# Patient Record
Sex: MALE | Race: BLACK OR AFRICAN AMERICAN
[De-identification: names, ages, dates, MRNs, and addresses within clinical notes are randomized per-mention and may not be internally consistent; named-entity substitution may affect disease eponyms.]

---

## 2020-01-17 ENCOUNTER — HOSPITAL ENCOUNTER (INPATIENT)
Dept: HOSPITAL 4 - SED | Age: 65
LOS: 4 days | Discharge: HOME | DRG: 279 | End: 2020-01-21
Attending: FAMILY MEDICINE | Admitting: FAMILY MEDICINE
Payer: MEDICAID

## 2020-01-17 VITALS — BODY MASS INDEX: 32.64 KG/M2 | WEIGHT: 228 LBS | HEIGHT: 70 IN

## 2020-01-17 VITALS — SYSTOLIC BLOOD PRESSURE: 142 MMHG

## 2020-01-17 VITALS — SYSTOLIC BLOOD PRESSURE: 98 MMHG

## 2020-01-17 VITALS — SYSTOLIC BLOOD PRESSURE: 128 MMHG

## 2020-01-17 VITALS — SYSTOLIC BLOOD PRESSURE: 111 MMHG

## 2020-01-17 DIAGNOSIS — I10: ICD-10-CM

## 2020-01-17 DIAGNOSIS — Z88.6: ICD-10-CM

## 2020-01-17 DIAGNOSIS — E11.65: ICD-10-CM

## 2020-01-17 DIAGNOSIS — T50.905A: ICD-10-CM

## 2020-01-17 DIAGNOSIS — F17.210: ICD-10-CM

## 2020-01-17 DIAGNOSIS — K74.60: ICD-10-CM

## 2020-01-17 DIAGNOSIS — G92: ICD-10-CM

## 2020-01-17 DIAGNOSIS — E86.0: ICD-10-CM

## 2020-01-17 DIAGNOSIS — D64.9: ICD-10-CM

## 2020-01-17 DIAGNOSIS — D69.6: ICD-10-CM
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
## 2020-01-17 DIAGNOSIS — K72.00: Primary | ICD-10-CM

## 2020-01-17 DIAGNOSIS — Z88.0: ICD-10-CM

## 2020-01-17 LAB
ALBUMIN SERPL BCP-MCNC: 2.4 G/DL (ref 3.4–4.8)
ALT SERPL W P-5'-P-CCNC: 70 U/L (ref 12–78)
AMPHETAMINES UR QL SCN: NEGATIVE
ANION GAP SERPL CALCULATED.3IONS-SCNC: 13 MMOL/L (ref 5–15)
APPEARANCE UR: CLEAR
AST SERPL W P-5'-P-CCNC: 119 U/L (ref 10–37)
BARBITURATES UR QL SCN: NEGATIVE
BASOPHILS # BLD AUTO: 0 K/UL (ref 0–0.2)
BASOPHILS NFR BLD AUTO: 0.7 % (ref 0–2)
BENZODIAZ UR QL SCN: POSITIVE
BILIRUB SERPL-MCNC: 1.3 MG/DL (ref 0–1)
BILIRUB UR QL STRIP: NEGATIVE
BUN SERPL-MCNC: 14 MG/DL (ref 8–21)
BZE UR QL SCN: NEGATIVE
CALCIUM SERPL-MCNC: 8.4 MG/DL (ref 8.4–11)
CANNABINOIDS UR QL SCN: POSITIVE
CHLORIDE SERPL-SCNC: 103 MMOL/L (ref 98–107)
COLOR UR: YELLOW
CREAT SERPL-MCNC: 0.88 MG/DL (ref 0.55–1.3)
EOSINOPHIL # BLD AUTO: 0.1 K/UL (ref 0–0.4)
EOSINOPHIL NFR BLD AUTO: 1.5 % (ref 0–4)
ERYTHROCYTE [DISTWIDTH] IN BLOOD BY AUTOMATED COUNT: 14.5 % (ref 9–15)
ETHANOL SERPL-MCNC: < 3 MG/DL (ref ?–10)
GFR SERPL CREATININE-BSD FRML MDRD: 111 ML/MIN (ref 90–?)
GLUCOSE SERPL-MCNC: 185 MG/DL (ref 70–99)
GLUCOSE UR STRIP-MCNC: NEGATIVE MG/DL
HCT VFR BLD AUTO: 33 % (ref 36–54)
HGB BLD-MCNC: 11.5 G/DL (ref 14–18)
HGB UR QL STRIP: NEGATIVE
KETONES UR STRIP-MCNC: NEGATIVE MG/DL
LEUKOCYTE ESTERASE UR QL STRIP: NEGATIVE
LIPASE SERPL-CCNC: 417 U/L (ref 73–393)
LYMPHOCYTES # BLD AUTO: 1.9 K/UL (ref 1–5.5)
LYMPHOCYTES NFR BLD AUTO: 32.5 % (ref 20.5–51.5)
MCH RBC QN AUTO: 33 PG (ref 27–31)
MCHC RBC AUTO-ENTMCNC: 35 % (ref 32–36)
MCV RBC AUTO: 94 FL (ref 79–98)
METHADONE UR-SCNC: NEGATIVE UMOL/L
METHAMPHET UR-SCNC: NEGATIVE UMOL/L
MONOCYTES # BLD MANUAL: 0.7 K/UL (ref 0–1)
MONOCYTES # BLD MANUAL: 12.7 % (ref 1.7–9.3)
NEUTROPHILS # BLD AUTO: 3.1 K/UL (ref 1.8–7.7)
NEUTROPHILS NFR BLD AUTO: 52.6 % (ref 40–70)
NITRITE UR QL STRIP: NEGATIVE
OPIATES UR QL SCN: POSITIVE
OXYCODONE SERPL-MCNC: NEGATIVE NG/ML
PCP UR QL SCN: NEGATIVE
PH UR STRIP: 6 [PH] (ref 5–8)
PLATELET # BLD AUTO: 87 K/UL (ref 130–430)
POTASSIUM SERPL-SCNC: 4.1 MMOL/L (ref 3.5–5.1)
PROT UR QL STRIP: NEGATIVE
RBC # BLD AUTO: 3.52 MIL/UL (ref 4.2–6.2)
SODIUM SERPLBLD-SCNC: 134 MMOL/L (ref 136–145)
SP GR UR STRIP: 1.02 (ref 1–1.03)
TRICYCLICS UR-MCNC: NEGATIVE NG/ML
URINE PROPOXYPHENE SCREEN: NEGATIVE
UROBILINOGEN UR STRIP-MCNC: 2 MG/DL (ref 0.2–1)
WBC # BLD AUTO: 5.9 K/UL (ref 4.8–10.8)

## 2020-01-17 PROCEDURE — G0482 DRUG TEST DEF 15-21 CLASSES: HCPCS

## 2020-01-17 PROCEDURE — G0378 HOSPITAL OBSERVATION PER HR: HCPCS

## 2020-01-17 PROCEDURE — C9113 INJ PANTOPRAZOLE SODIUM, VIA: HCPCS

## 2020-01-17 RX ADMIN — Medication SCH EA: at 21:20

## 2020-01-17 RX ADMIN — LACTULOSE SCH ML: 10 SOLUTION ORAL; RECTAL at 21:17

## 2020-01-17 RX ADMIN — POTASSIUM CHLORIDE AND SODIUM CHLORIDE SCH MLS/HR: 450; 150 INJECTION, SOLUTION INTRAVENOUS at 15:54

## 2020-01-17 RX ADMIN — SODIUM CHLORIDE SCH MG: 9 INJECTION, SOLUTION INTRAVENOUS at 21:17

## 2020-01-17 RX ADMIN — HUMAN INSULIN PRN UNITS: 100 INJECTION, SOLUTION SUBCUTANEOUS at 21:32

## 2020-01-17 RX ADMIN — HUMAN INSULIN PRN UNITS: 100 INJECTION, SOLUTION SUBCUTANEOUS at 17:46

## 2020-01-17 RX ADMIN — Medication SCH EA: at 17:25

## 2020-01-17 NOTE — NUR
pt is awake, alert, eating snacks, no pain. no sob. ivf infusing well. needs attended. will 
monitor.

## 2020-01-17 NOTE — NUR
CLOSING NOTE//BM

Pt's  Mr. Ventura came and brought the ankle GPS bracelet  for pt-pt 
currently charging his ankle bracelet. Mr. Ventura asked to be notified of any D/C plans. 

Pt sitting up in bed eating his dinner. No s/s resp distress, no c/o pain or discomfort. IVF 
infusing well to LAC at ordered rate with no s/s infiltration to site. Noted that pt 
ambulated to the bathroom with FWW and Physical therapy and had a BM. Skin and safety 
precautions remain in place. Call light within reach.

## 2020-01-17 NOTE — NUR
ROUNDS

Pt resting quietly in bed with eyes closed with no s/s resp distress, no s/s pain or 
discomfort. Pt seen by Dr. Pulido earlier-new orders noted-will give due medications. No 
changes, call light within reach.

## 2020-01-17 NOTE — NUR
****P.T. NOTES****

P.T. HEATHER COMPLETED; PATIENT MAY AMBULATE W/ FWW W/ NURSE JEM MENDEZ ANKLE BRACELET 
MONITORING DEVICE INTACT.

-------------------------------------------------------------------------------

Addendum: 01/17/20 at 1815 by Ann Clay PT

-------------------------------------------------------------------------------

Amended: Links added.

## 2020-01-17 NOTE — NUR
start new iv site left fore arm using agioccath gauge 20, good blood return, done 
aseptically.pt tolerate well.

## 2020-01-17 NOTE — NUR
ADMISSION NOTE

Received patient from ER via ramez, received report from TOSHA VALENTINO. Patient admitted with 
diagnosis of ENCEPALOPHATY. Patient oriented to hospital routine, call light, toileting and 
safety-patient verbalized understanding.

## 2020-01-17 NOTE — NUR
Patient BIB BLS C/O weakness. Patient A&Ox3, afebrile, skin pink and cool, 
denies N/V/D, pain 7/10. Patient seth he has recent hospital discharge form 
[place], he has chronic back pain and feels weak.

## 2020-01-17 NOTE — NUR
Placed in room 4  . Placed on cardiac monitor, blood pressure machine and pulse 
oximeter. To gown for exam. Side rails up.

Report given to DAYSHIFT NURSES.

## 2020-01-17 NOTE — NUR
# 20 gauge angiocath placed to left AC.  Use of asceptic technique.  Opsite 
placed over site.  Blood return noted.  Blood for lab drawn from site.  Flushed 
with 10 cc of normal saline.  No evidence of infiltration noted.  Patient 
tolerated well.

## 2020-01-17 NOTE — NUR
Patient will be admitted to care of DR. SERRANO.  Admitted to TELE unit.  Will 
go to room .  Belongings list completed.  Complete and up to date summary 
report printed. SBAR report to be given at bedside with opportunity for 
questions.

Transfer to TELE via ACLS protocol. Licensed nurse present. IV present no signs 
or symptoms of infiltration.

## 2020-01-17 NOTE — NUR
Pt's  Mr. Garcia called and stated that the pt does not live with his sister 
and that he may need assistance with finding a place to stay if discharged over the weekend. 
Mr. Garcia's phone number: [phone]. Pt has a GPS ankle monitor on his left ankle. Pt 
does not have a  for the ankle bracelet. Message left at  ext [phone] to 
follow-up.

## 2020-01-17 NOTE — NUR
initial notes: 

pt is on bed. alert, awake,oriented x 2, no pain. no sob. not distress. stable vital sign, 
ivf infusing to left ac gauge 22- intact. pt is complaining pain to the iv site. no skin 
breakdown at the back. scabs seen to right forehead, explain to pt plan of care, pt agree. 
needs attended, call light in reach, side rails up. low bed position. will monitor.

## 2020-01-18 VITALS — SYSTOLIC BLOOD PRESSURE: 127 MMHG

## 2020-01-18 VITALS — SYSTOLIC BLOOD PRESSURE: 144 MMHG

## 2020-01-18 VITALS — SYSTOLIC BLOOD PRESSURE: 131 MMHG

## 2020-01-18 VITALS — SYSTOLIC BLOOD PRESSURE: 119 MMHG

## 2020-01-18 LAB
AMYLASE SERPL-CCNC: 85 U/L (ref 0–100)
ANION GAP SERPL CALCULATED.3IONS-SCNC: 6 MMOL/L (ref 5–15)
BASOPHILS # BLD AUTO: 0 K/UL (ref 0–0.2)
BASOPHILS NFR BLD AUTO: 1.1 % (ref 0–2)
BUN SERPL-MCNC: 13 MG/DL (ref 8–21)
CALCIUM SERPL-MCNC: 7.5 MG/DL (ref 8.4–11)
CHLORIDE SERPL-SCNC: 110 MMOL/L (ref 98–107)
CREAT SERPL-MCNC: 0.89 MG/DL (ref 0.55–1.3)
EOSINOPHIL # BLD AUTO: 0.2 K/UL (ref 0–0.4)
EOSINOPHIL NFR BLD AUTO: 4.5 % (ref 0–4)
ERYTHROCYTE [DISTWIDTH] IN BLOOD BY AUTOMATED COUNT: 14.8 % (ref 9–15)
GFR SERPL CREATININE-BSD FRML MDRD: 110 ML/MIN (ref 90–?)
GLUCOSE SERPL-MCNC: 92 MG/DL (ref 70–99)
HCT VFR BLD AUTO: 30.6 % (ref 36–54)
HGB BLD-MCNC: 10.8 G/DL (ref 14–18)
LIPASE SERPL-CCNC: 449 U/L (ref 73–393)
LYMPHOCYTES # BLD AUTO: 2.4 K/UL (ref 1–5.5)
LYMPHOCYTES NFR BLD AUTO: 52.8 % (ref 20.5–51.5)
MCH RBC QN AUTO: 34 PG (ref 27–31)
MCHC RBC AUTO-ENTMCNC: 35 % (ref 32–36)
MCV RBC AUTO: 95 FL (ref 79–98)
MONOCYTES # BLD MANUAL: 0.5 K/UL (ref 0–1)
MONOCYTES # BLD MANUAL: 10.2 % (ref 1.7–9.3)
NEUTROPHILS # BLD AUTO: 1.4 K/UL (ref 1.8–7.7)
NEUTROPHILS NFR BLD AUTO: 31.4 % (ref 40–70)
PLATELET # BLD AUTO: 72 K/UL (ref 130–430)
POTASSIUM SERPL-SCNC: 4 MMOL/L (ref 3.5–5.1)
RBC # BLD AUTO: 3.22 MIL/UL (ref 4.2–6.2)
SODIUM SERPLBLD-SCNC: 138 MMOL/L (ref 136–145)
WBC # BLD AUTO: 4.6 K/UL (ref 4.8–10.8)

## 2020-01-18 RX ADMIN — Medication SCH EA: at 18:08

## 2020-01-18 RX ADMIN — POTASSIUM CHLORIDE AND SODIUM CHLORIDE SCH MLS/HR: 450; 150 INJECTION, SOLUTION INTRAVENOUS at 22:26

## 2020-01-18 RX ADMIN — Medication SCH EA: at 21:04

## 2020-01-18 RX ADMIN — LACTULOSE SCH ML: 10 SOLUTION ORAL; RECTAL at 08:24

## 2020-01-18 RX ADMIN — POTASSIUM CHLORIDE AND SODIUM CHLORIDE SCH MLS/HR: 450; 150 INJECTION, SOLUTION INTRAVENOUS at 02:01

## 2020-01-18 RX ADMIN — SODIUM CHLORIDE SCH MG: 9 INJECTION, SOLUTION INTRAVENOUS at 08:24

## 2020-01-18 RX ADMIN — POTASSIUM CHLORIDE AND SODIUM CHLORIDE SCH MLS/HR: 450; 150 INJECTION, SOLUTION INTRAVENOUS at 12:06

## 2020-01-18 RX ADMIN — LACTULOSE SCH ML: 10 SOLUTION ORAL; RECTAL at 21:05

## 2020-01-18 RX ADMIN — SODIUM CHLORIDE SCH MG: 9 INJECTION, SOLUTION INTRAVENOUS at 21:05

## 2020-01-18 RX ADMIN — Medication SCH EA: at 06:23

## 2020-01-18 RX ADMIN — Medication SCH EA: at 12:06

## 2020-01-18 NOTE — NUR
CLOSING NOTE

PT SITTING AT EDGE OF BED EATING DINNER. PT DENIES ANY PAIN OR DISCOMFORT AT THIS TIME. IVF 
INFUSING WELL, CALL LIGHT WITHIN REACH, BED IN LOW AND LOCKED POSITION WITH BED ALARM ON. 
WILL CONTINUE TO MONITOR UNTIL PT CARE IS ENDORSED TO NIGHT SHIFT RN.

## 2020-01-18 NOTE — NUR
INITIAL NOTE

PT AWAKE, DENIES ANY DISCOMFORT AT THIS TIME. PT ON ROOM AIR, TOLERATING WELL. IVF INFUSING 
WELL, CALL LIGHT WITHIN REACH, BED IN LOW AND LOCKED POSITION WITH BED ALARM ON.

## 2020-01-18 NOTE — NUR
sleeping, no pain. no sob and not distress. ivf infusing well. call light in reach. safety 
on. will monitor.

## 2020-01-18 NOTE — NUR
sleeping, no pain. no sob and not distress. ivf infusing well. call light in reach. safety 
on. will monitor

## 2020-01-18 NOTE — NUR
CHELSY TIRADO/DR. RONY NGO AT BEDSIDE EXAMINING PT. PT AWAKE, COOPERATIVE. MD INQUIRING ABOUT PT HOME MEDS. PT 
CONFUSED AND UNSURE WHERE HIS MEDICATIONS ARE. PT STATES HE IS ON PAROLE AND HAD RECEIVED A 
30 DAY PRESCRIPTION OF MEDICATIONS UPON RELEASE FROM halfway. WILL REATTEMPT TO CONTACT 
 AND GET MORE INFORMATION ON HOME MEDICATIONS FOR PT.

## 2020-01-18 NOTE — NUR
closing:

sleeping, wakes up on report. no pain. no sob and not distress. ivf infusing well. call 
light in reach. safety on.bedside report given to am rn.

## 2020-01-18 NOTE — NUR
sleeping, no acute distress. no sign of pain, stable, call light in reach. safety on. will 
monitor.

## 2020-01-18 NOTE — NUR
BACK FROM CT

PT REFUSING TO BE RECONNECTED BACK TO IVF. EDUCATED PT ON USE AND PURPOSE OF IVF, PT 
VERBALIZED UNDERSTANDING. PT REFUSING TO BE CONNECTED AT THIS TIME. WILL REATTEMPT LATER.

## 2020-01-18 NOTE — NUR
NO INSULIN COVERAGE NEEDED. PT RESTING IN BED. COOPERATIVE AND ALLOWED FOR VITAL SIGNS TO BE 
TAKEN. PT REFUSED TO BE RECONNECTED TO IVF. WILL REATTEMPT AGAIN LATER.

## 2020-01-18 NOTE — NUR
initial notes: 

pt is on bed. alert, awake,oriented x 2, no pain. no sob. not distress. stable vital sign, 
ivf infusing to left forearm gauge 20, pt complaining pain and swelling to the iv site. turn 
of ivf and will start new iv site. no skin breakdown at the back.  explain to pt plan of 
care, pt agree. needs attended, call light in reach, side rails up. low bed position. will 
monitor.

-------------------------------------------------------------------------------

Addendum: 01/19/20 at 0014 by Andres Muir RN

-------------------------------------------------------------------------------

initial notes for 1915 time.

## 2020-01-18 NOTE — NUR
NO INSULIN COVERAGE NEEDED. PT DENIES ANY PAIN OR DISCOMFORT. DIMMED LIGHTS, ASSISTED WITH 
REPOSITIONING.

## 2020-01-18 NOTE — NUR
pt is resting, wakes up for blood sugar check-98, no coverage. ivf infusing well. needs 
attended. call light in reach. will monitor.

## 2020-01-19 VITALS — SYSTOLIC BLOOD PRESSURE: 147 MMHG

## 2020-01-19 VITALS — SYSTOLIC BLOOD PRESSURE: 126 MMHG

## 2020-01-19 VITALS — SYSTOLIC BLOOD PRESSURE: 157 MMHG

## 2020-01-19 VITALS — SYSTOLIC BLOOD PRESSURE: 150 MMHG

## 2020-01-19 VITALS — SYSTOLIC BLOOD PRESSURE: 144 MMHG

## 2020-01-19 VITALS — SYSTOLIC BLOOD PRESSURE: 141 MMHG

## 2020-01-19 LAB
ALBUMIN SERPL BCP-MCNC: 1.9 G/DL (ref 3.4–4.8)
ALT SERPL W P-5'-P-CCNC: 59 U/L (ref 12–78)
ANION GAP SERPL CALCULATED.3IONS-SCNC: 6 MMOL/L (ref 5–15)
AST SERPL W P-5'-P-CCNC: 98 U/L (ref 10–37)
BASOPHILS # BLD AUTO: 0 K/UL (ref 0–0.2)
BASOPHILS NFR BLD AUTO: 0.7 % (ref 0–2)
BILIRUB SERPL-MCNC: 1.2 MG/DL (ref 0–1)
BUN SERPL-MCNC: 9 MG/DL (ref 8–21)
CALCIUM SERPL-MCNC: 7.5 MG/DL (ref 8.4–11)
CHLORIDE SERPL-SCNC: 109 MMOL/L (ref 98–107)
CREAT SERPL-MCNC: 0.77 MG/DL (ref 0.55–1.3)
EOSINOPHIL # BLD AUTO: 0.2 K/UL (ref 0–0.4)
EOSINOPHIL NFR BLD AUTO: 3.9 % (ref 0–4)
ERYTHROCYTE [DISTWIDTH] IN BLOOD BY AUTOMATED COUNT: 14.8 % (ref 9–15)
GFR SERPL CREATININE-BSD FRML MDRD: 130 ML/MIN (ref 90–?)
GLUCOSE SERPL-MCNC: 88 MG/DL (ref 70–99)
HCT VFR BLD AUTO: 30.5 % (ref 36–54)
HGB BLD-MCNC: 10.6 G/DL (ref 14–18)
LYMPHOCYTES # BLD AUTO: 2.5 K/UL (ref 1–5.5)
LYMPHOCYTES NFR BLD AUTO: 54.3 % (ref 20.5–51.5)
MCH RBC QN AUTO: 33 PG (ref 27–31)
MCHC RBC AUTO-ENTMCNC: 35 % (ref 32–36)
MCV RBC AUTO: 94 FL (ref 79–98)
MONOCYTES # BLD MANUAL: 0.4 K/UL (ref 0–1)
MONOCYTES # BLD MANUAL: 8.5 % (ref 1.7–9.3)
NEUTROPHILS # BLD AUTO: 1.5 K/UL (ref 1.8–7.7)
NEUTROPHILS NFR BLD AUTO: 32.6 % (ref 40–70)
PLATELET # BLD AUTO: 78 K/UL (ref 130–430)
POTASSIUM SERPL-SCNC: 3.8 MMOL/L (ref 3.5–5.1)
RBC # BLD AUTO: 3.23 MIL/UL (ref 4.2–6.2)
SODIUM SERPLBLD-SCNC: 138 MMOL/L (ref 136–145)
WBC # BLD AUTO: 4.5 K/UL (ref 4.8–10.8)

## 2020-01-19 RX ADMIN — Medication SCH EA: at 22:24

## 2020-01-19 RX ADMIN — POTASSIUM CHLORIDE AND SODIUM CHLORIDE SCH MLS/HR: 450; 150 INJECTION, SOLUTION INTRAVENOUS at 08:04

## 2020-01-19 RX ADMIN — LACTULOSE SCH ML: 10 SOLUTION ORAL; RECTAL at 22:20

## 2020-01-19 RX ADMIN — Medication SCH EA: at 06:14

## 2020-01-19 RX ADMIN — SODIUM CHLORIDE SCH MG: 9 INJECTION, SOLUTION INTRAVENOUS at 22:20

## 2020-01-19 RX ADMIN — LACTULOSE SCH ML: 10 SOLUTION ORAL; RECTAL at 08:03

## 2020-01-19 RX ADMIN — POTASSIUM CHLORIDE AND SODIUM CHLORIDE SCH MLS/HR: 450; 150 INJECTION, SOLUTION INTRAVENOUS at 18:04

## 2020-01-19 RX ADMIN — SODIUM CHLORIDE SCH MG: 9 INJECTION, SOLUTION INTRAVENOUS at 08:03

## 2020-01-19 RX ADMIN — Medication SCH EA: at 11:15

## 2020-01-19 RX ADMIN — Medication SCH EA: at 16:56

## 2020-01-19 NOTE — NUR
sleeping, no pain. no sob and not distress. ivf infusing well- no sign of infiltration. call 
light in reach. safety on. will monitor

## 2020-01-19 NOTE — NUR
closing:

pt is awake, alert.  no pain. no sob and not distress. ivf infusing well. call light in 
reach. safety on.bedside report given to am rn.

## 2020-01-19 NOTE — NUR
rounds

patient is resting in bed, watching tv, no signs of distress at this time, no needs 
addressed at this time, IVF running and patient tolerating well, fall/safety precautions in 
place.

## 2020-01-19 NOTE — NUR
assisted patient to the bathroom

patient walked steady to the bathroom and back to bed, no signs of distress at this time, no 
other needs addressed at this time, fall/safety precautions in place.

## 2020-01-19 NOTE — NUR
pt is still awake, no pain. stable vital sign. ivf infusing well. no sign of infiltration. 
needs attended. safety on. call light in reach. will monitor.

## 2020-01-19 NOTE — NUR
opening note

patient is resting in bed, alert to name and , educated on call light system and plan of 
care, patient kept repeating sentences and did not give me a straight verbal understanding, 
no signs of distress at this time, IV site dressing intact with IVF running and patient 
tolerating well, no other needs addressed at this time, bed in lowest position, bed alarm 
on, call light within reach, two side rails up, fall/safety precautions in place.

## 2020-01-19 NOTE — NUR
MED PASS

Due medication given at this time. Patient tolerated well. No signs of respiratory distress 
noted. Denies pain and discomfort at this time. Safety precautions in place. Will continue 
to monitor.

## 2020-01-19 NOTE — NUR
rounds

patient is resting in bed, does not want to watching tv, no signs of distress at this time, 
no needs addressed at this time, IVF running and patient tolerating well, fall/safety 
precautions in place.

## 2020-01-19 NOTE — NUR
OPENING NOTES

Receive report from morning shift nurse. Patient AOx2, confusion is noted, patient to talks 
to himself. Patient calm and cooperative and follow simple commands. No signs of respiratory 
distress and discomfort noted. HOB raised. IVF infusing well, patency noted. SCD's operating 
well. Call light within reach. Bed locked and in lowest position. Safety precautions in 
place. Will continue to monitor patient

## 2020-01-19 NOTE — NUR
PT WAKES UP. NO PAIN. BLOOD SUGAR 87, STABLE. NEEDS ATTENDED. CALL LIGHT IN REACH. SAFETY 
ON. WILL MONITOR.

## 2020-01-19 NOTE — NUR
RN ROUNDS

Patient awake at this time, quietly sitting in bed. No signs of respiratory distress noted. 
Denies pain and discomfort at this time. Breathing even and unlabored. Safety precautions in 
place. Call light within reach. Will continue to monitor

## 2020-01-19 NOTE — NUR
closing note

patient is resting in bed, no signs of distress at this time, IV site dressing intact with 
IVF running and patient tolerating well, no other needs addressed at this time, bed in 
lowest position, bed alarm on, call light within reach, two side rails up, fall/safety 
precautions in place, patient can ambulate to the bathroom, will endorse report to noc shift 
nurse to continue with care,  consult was ordered because patient may be 
possibly homeless.

## 2020-01-20 VITALS — SYSTOLIC BLOOD PRESSURE: 147 MMHG

## 2020-01-20 VITALS — SYSTOLIC BLOOD PRESSURE: 130 MMHG

## 2020-01-20 VITALS — SYSTOLIC BLOOD PRESSURE: 141 MMHG

## 2020-01-20 RX ADMIN — SODIUM CHLORIDE SCH MG: 9 INJECTION, SOLUTION INTRAVENOUS at 08:29

## 2020-01-20 RX ADMIN — LACTULOSE SCH ML: 10 SOLUTION ORAL; RECTAL at 20:48

## 2020-01-20 RX ADMIN — POTASSIUM CHLORIDE AND SODIUM CHLORIDE SCH MLS/HR: 450; 150 INJECTION, SOLUTION INTRAVENOUS at 03:45

## 2020-01-20 RX ADMIN — Medication SCH EA: at 11:53

## 2020-01-20 RX ADMIN — Medication SCH EA: at 20:48

## 2020-01-20 RX ADMIN — Medication SCH EA: at 17:10

## 2020-01-20 RX ADMIN — HUMAN INSULIN PRN UNITS: 100 INJECTION, SOLUTION SUBCUTANEOUS at 06:39

## 2020-01-20 RX ADMIN — Medication SCH EA: at 06:39

## 2020-01-20 RX ADMIN — SODIUM CHLORIDE SCH MG: 9 INJECTION, SOLUTION INTRAVENOUS at 20:49

## 2020-01-20 RX ADMIN — LACTULOSE SCH ML: 10 SOLUTION ORAL; RECTAL at 08:29

## 2020-01-20 NOTE — NUR
Pt is fully awake, alert and oriented x4. Pt is resting quietly in bed. No c/o pain or 
discomfort. Skin is warm and dry to touch. No signs or symptoms of hypoglycemia or 
hyperglycemia noted. Saline lock is intact in RFA. Fall and safety precautions are in place.

## 2020-01-20 NOTE — NUR
Nutrition Update



Blaise Scale 18 noted.

Pt admitted for Encephalopathy

Diet: [facility]

BMI: 32.7 kg/m2

RD to follow per nutrition care standards.

## 2020-01-20 NOTE — NUR
CLOSING NOTES

Patient AOx2, confusion is noted, patient to talks to himself. Patient calm and cooperative 
and follow simple commands. No signs of respiratory distress noted. Denies pain and 
discomfort at this time. HOB raised. IVF infusing well, patency noted. SCD's operating well. 
Call light within reach. Bed locked and in lowest position. Safety precautions in place. All 
needs met throughout the shift. Endorsed to CHELSY Campos for continuity of care.

## 2020-01-20 NOTE — NUR
; met with pt. to conduct a DCPA.

MSW met with pt. He was very slow to respond to questions. He was an active participant. He 
stated he was just released from the [facility] , served 33 years for 1st degree murder. 
Pt. stated he did not know if he had medical. He stated he has a twin sister Ivon Mendoza. He stated he is homeless, but that she was had to take him in. He was staying 
temporarily at the [facility] in [place]. Pt. stated he will need clothing and help with 
transportation. 



MSW spoke to Enrike from [place]. He is looking into this case and has already spoken to 
pt.





MSW called and left a message for parole office Mr. Pinto, [phone] to notify him pt. 
will be discharged. Millie called MSW asking for a big favor. He stated Pt. is not mentally 
capable fo caring for self and he no longer has any motel days at the [facility] [employer] in [place]. 
He was stating pt. has seizures and will fall . When this  happens, pt cannot get back up. 
 can  pt. tomorrow between 9-10 to take him to a home in [place], but 
this cannot happen due to the holiday. MSW got approval from supervisor, Pt will be kept 
until tomorrow. This info was communicated to Lisa Campos.  and to pt. as well. Pt. stated he 
will not go to [place]  as [place]. 
[place] will kill him. MSW stated his parol officer knows this and is taking him to 
[place]. 



MSW called Lisa Campos to notify her as per parole office. pt. suffers from seizures. Rn will 
look in file to see if this is noted in pts. medical files. 



MSW will remain available as needed.

## 2020-01-20 NOTE — NUR
RN ROUNDS

Patient asleep at this time. No signs of respiratory distress and discomfort noted. 
Breathing even and unlabored. Safety precautions in place. Call light within reach. Will 
continue to monitor

## 2020-01-20 NOTE — NUR
NEW IV HUNG

New IV hung at this time, patient awake. No signs of respiratory distress noted. Denies pain 
and discomfort. Safety precautions in place. Will continue to monitor patient.

## 2020-01-20 NOTE — NUR
PT WAS MOVED TO ROOM 100B AND NO C/O DISCOMFORT.VSS.BS NO INSULIN NEEDED. TO 
FOLLOW UP WITH TOMORROW BEFORE CAN BE D/C.PT AWARE OF THIS.

## 2020-01-20 NOTE — NUR
received awake and in no c/o discomfort.vss resp even and unlabored and sats 97% on ra.up 
amb to br.iv infusing at 100 hr. will continue to monitor.call bell in place

## 2020-01-20 NOTE — NUR
Accucheck 130 and no Insulin coverage needed. Skin remains warm and dry to touch. Pt ate 2 
1/2 turkey sandwiches and drank 2 cups of apple juices for HS snacks.

## 2020-01-21 VITALS — SYSTOLIC BLOOD PRESSURE: 124 MMHG

## 2020-01-21 RX ADMIN — LACTULOSE SCH ML: 10 SOLUTION ORAL; RECTAL at 08:21

## 2020-01-21 RX ADMIN — Medication SCH EA: at 06:41

## 2020-01-21 RX ADMIN — SODIUM CHLORIDE SCH MG: 9 INJECTION, SOLUTION INTRAVENOUS at 08:21

## 2020-01-21 NOTE — NUR
DISCHARGE NOTE

DISCHARGE PACKET AND INSTRUCTIONS WITH PATIENT. ALL BELONGINGS WITH PATIENT. PATIENT DENIES 
ANY PAIN OR DISCOMFORT. IV CATHETER REMOVED, CATHETER INTACT, NO BLEEDING. ID HOSPITAL BAND 
REMOVED. ESCORTED PT VIA WHEELCHAIR TO HOSPITAL PARKING LOT. JAVIER TO TAKE 
PT HOME.

## 2020-01-21 NOTE — NUR
INITIAL NOTE

PT RESTING IN BED, NO ACUTE DISTRESS NOTED, BREATHING EVEN AND UNLABORED. PT ROOM AIR, 
TOLERATING WELL. IV SALINE LOCKED. CALL LIGHT WITHIN REACH, BED IN LOW AND LOCKED POSITION 
WITH BED ALARM ON.